# Patient Record
(demographics unavailable — no encounter records)

---

## 2025-06-02 NOTE — HISTORY OF PRESENT ILLNESS
[FreeTextEntry1] : Pt is a 51 y/o F who presents today for f/u.  Pt has MVP with mod MR, HTN.  Family hx: father CHF/mitral and tricuspid valve repair/afib 70's, mother HLD  Pt reports feeling well and has no active cardiac complaints - denies CP, SOB, palpitations, dizziness, syncope, edema, orthopnea, PND, orthopnea.  No exertional symptoms. No new hospitalizations, emergency room/urgent care visits, new medical diagnoses, new medications, surgery, or cardiac testing since last OV.   TTE 08/2019 EF 60-65%, MVP with mod-sev MR, mild DD TTE 08/2020 EF 60%, MVP with mod MR TTE 09/2021 EF 60-65%, MVP with mod MR, min TR TTE 09/2022 EF 60-65% MVP with mod MR TTE 09/2023 EF 66%, MVP with mod MR, trace TR TTE 11/2024 EF 55-60%, PVP with mod MR stress echo 09/2021 wnl ETT 08/2019 exercise time 10min, no ischemic changes  Original OV: 06/18/2019 fainted.  The night before she had some alcohol at a concert, she was also dieting at the time, not drinking as much water, new exercise.  She went to ED at [facility] - laceration, CT head negative, ECG showed NSR with PVCs in bigeminy pattern.  Since then she has been feeling well but notes occasional palpitations.  She denies CP, SOB, diaphoresis, dizziness, syncope, LE edema, PND.   Smoking status: none social ETOH no drug use Current exercise: biking and weight training 2-3x/week  Daily water intake: 16-36 oz Daily caffeine intake: 2 cups coffee OTC medications: none Family hx: father CHF/mitral and tricuspid valve repair/afib 70's, mother HLD Previous hospitalizations: none 1 child - preeclampsia/HTN

## 2025-06-02 NOTE — PHYSICAL EXAM
[Well Developed] : well developed [Well Nourished] : well nourished [No Acute Distress] : no acute distress [Normal Conjunctiva] : normal conjunctiva [Normal Venous Pressure] : normal venous pressure [No Carotid Bruit] : no carotid bruit [Normal S1, S2] : normal S1, S2 [No Rub] : no rub [No Gallop] : no gallop [Clear Lung Fields] : clear lung fields [Good Air Entry] : good air entry [No Respiratory Distress] : no respiratory distress  [Soft] : abdomen soft [Non Tender] : non-tender [No Masses/organomegaly] : no masses/organomegaly [Normal Bowel Sounds] : normal bowel sounds [Normal Gait] : normal gait [No Edema] : no edema [No Cyanosis] : no cyanosis [No Clubbing] : no clubbing [No Varicosities] : no varicosities [No Rash] : no rash [No Skin Lesions] : no skin lesions [Moves all extremities] : moves all extremities [No Focal Deficits] : no focal deficits [Normal Speech] : normal speech [Alert and Oriented] : alert and oriented [Normal memory] : normal memory [de-identified] : +2/6 sys murmur

## 2025-06-02 NOTE — DISCUSSION/SUMMARY
[EKG obtained to assist in diagnosis and management of assessed problem(s)] : EKG obtained to assist in diagnosis and management of assessed problem(s) [FreeTextEntry1] : Pt is a 53 y/o F PMH MVP with mod MR, HTN.   MR: pt asymptomatic serial TTEs c/w lisinopril  labs to be repeated soon by PCP  HTN: not well controlled increase lisinopril to 5mg qd  Discussed the long-term health risks of uncontrolled BP.  Advised low salt diet, regular exercise, maintaining ideal weight. Encouraged pt to check BP at home and keep journal   HLD: Advise lifestyle modifications  labs to be done with PCP next week  Pt will return in 6 mnths but I encouraged communication via phone or portal if necessary.  The described plan was discussed with the pt.  All questions and concerns were addressed to the best of my knowledge.    Today's office visit encompassed 32 minutes which excludes teaching and separately reported services. I conducted an extensive history, physical exam and reviewed diagnosis and treatment options including diagnostic tests, radiology studies including cat scans and the use of prescription medication.

## 2025-06-13 NOTE — ASSESSMENT
[Vaccines Reviewed] : Immunizations reviewed today. Please see immunization details in the vaccine log within the immunization flowsheet.  [FreeTextEntry1] : Health care maintenance: check blood work follow up with optometry/ophthalmology and dentist for routine exams when due scheduled to see her GYN  will get mammogram referral from GYN up to date with colonoscopy recommend Shingles vaccine  Pre-diabetes: last A1C 5.2, normal  c/w diet and exercise as tolerated  check blood work  Osteopenia: following with endocrinology  will get bone density referral from her GYN   Anxiety: c/w Alprazolam PRN  HTN: stable c/w Lisinopril

## 2025-06-13 NOTE — PHYSICAL EXAM
[No Acute Distress] : no acute distress [Well Nourished] : well nourished [Well Developed] : well developed [Well-Appearing] : well-appearing [Normal Sclera/Conjunctiva] : normal sclera/conjunctiva [PERRL] : pupils equal round and reactive to light [Normal Oropharynx] : the oropharynx was normal [Normal TMs] : both tympanic membranes were normal [Normal Appearance] : was normal in appearance [Neck Supple] : was supple [Normal] : the thyroid was normal [No Respiratory Distress] : no respiratory distress  [Clear to Auscultation] : lungs were clear to auscultation bilaterally [Normal Rate] : normal rate  [Regular Rhythm] : with a regular rhythm [Normal S1, S2] : normal S1 and S2 [No Carotid Bruits] : no carotid bruits [No Edema] : there was no peripheral edema [Soft] : abdomen soft [Non Tender] : non-tender [Normal Bowel Sounds] : normal bowel sounds [Normal Posterior Cervical Nodes] : no posterior cervical lymphadenopathy [Normal Anterior Cervical Nodes] : no anterior cervical lymphadenopathy [Grossly Normal Strength/Tone] : grossly normal strength/tone [No Rash] : no rash [No Focal Deficits] : no focal deficits [Normal Affect] : the affect was normal [Alert and Oriented x3] : oriented to person, place, and time [Normal Mood] : the mood was normal [de-identified] : + murmur

## 2025-06-13 NOTE — HISTORY OF PRESENT ILLNESS
[FreeTextEntry1] : Annual physical  [de-identified] : 52 year old female presents for annual physical. She currently feels tired from working. She otherwise feels well today.   She has established with endocrinology- Dr. Del Toro, regarding her bone density/Osteopenia. She will likely plan to go for repeat bone density later in the summer.   has h/o Pre-diabetes- A1C 5.7 from January 2024 focused on her diet   Follows with cardiology- HTN, Mitral valve prolapse with moderate-severe mitral valve regurgitation  on Lisinopril- dose increased to 5 mg daily   Does c/o anxiety- rarely takes Alprazolam  reports she started feeling "twitching sensation" to her left hand over the past month was concerned as her father had h/o Parkinsons

## 2025-06-13 NOTE — HEALTH RISK ASSESSMENT
[Yes] : Yes [2 - 3 times a week (3 pts)] : 2 - 3  times a week (3 points) [1 or 2 (0 pts)] : 1 or 2 (0 points) [Never (0 pts)] : Never (0 points) [No] : In the past 12 months have you used drugs other than those required for medical reasons? No [Never] : Never [Patient reported mammogram was normal] : Patient reported mammogram was normal [Patient reported PAP Smear was normal] : Patient reported PAP Smear was normal [Patient reported colonoscopy was normal] : Patient reported colonoscopy was normal [Audit-CScore] : 3 [de-identified] : Using a stationary bicycle, weight bearing exercises  [de-identified] : Diet is generally good  [MammogramDate] : 08/2024 [PapSmearDate] : 07/2024 [ColonoscopyDate] : 12/2019